# Patient Record
Sex: FEMALE | Race: ASIAN | NOT HISPANIC OR LATINO | URBAN - METROPOLITAN AREA
[De-identification: names, ages, dates, MRNs, and addresses within clinical notes are randomized per-mention and may not be internally consistent; named-entity substitution may affect disease eponyms.]

---

## 2017-08-23 PROBLEM — 16331000 HEARTBURN: Status: ACTIVE | Noted: 2017-08-23

## 2017-08-23 PROBLEM — 428283002 HISTORY OF POLYP OF COLON (SITUATION): Status: ACTIVE | Noted: 2017-08-23

## 2021-04-12 ENCOUNTER — OFFICE VISIT (OUTPATIENT)
Dept: URBAN - METROPOLITAN AREA CLINIC 42 | Facility: CLINIC | Age: 61
End: 2021-04-12
Payer: COMMERCIAL

## 2021-04-12 ENCOUNTER — DASHBOARD ENCOUNTERS (OUTPATIENT)
Age: 61
End: 2021-04-12

## 2021-04-12 DIAGNOSIS — K27.9 PUD (PEPTIC ULCER DISEASE): ICD-10-CM

## 2021-04-12 PROBLEM — 13200003: Status: ACTIVE | Noted: 2021-04-12

## 2021-04-12 PROCEDURE — 99203 OFFICE O/P NEW LOW 30 MIN: CPT | Performed by: INTERNAL MEDICINE

## 2021-04-12 NOTE — HPI-TODAY'S VISIT:
The patient is here for a history of PUD. She has been a patient of Dr. Abe Toney and actually wanted to see him instead of me and she felt that she might have made this appointment by mistake.  She denies any active GI symptoms.  Her last EGD in 2015 did not show any signs of PUD.  Her colonoscopy was in 2015 as well and had a benign polyp removed.

## 2021-04-26 ENCOUNTER — OFFICE VISIT (OUTPATIENT)
Dept: URBAN - METROPOLITAN AREA CLINIC 37 | Facility: CLINIC | Age: 61
End: 2021-04-26

## 2021-04-26 ENCOUNTER — LAB OUTSIDE AN ENCOUNTER (OUTPATIENT)
Dept: URBAN - METROPOLITAN AREA CLINIC 37 | Facility: CLINIC | Age: 61
End: 2021-04-26

## 2021-04-26 VITALS — BODY MASS INDEX: 18.5 KG/M2 | WEIGHT: 98 LBS | HEIGHT: 61 IN

## 2021-04-26 RX ORDER — PANTOPRAZOLE SODIUM 40 MG/1
1 TABLET TABLET, DELAYED RELEASE ORAL ONCE A DAY
Status: ACTIVE | COMMUNITY

## 2021-04-26 RX ORDER — SIMVASTATIN 20 MG/1
1 TABLET IN THE EVENING TABLET, FILM COATED ORAL
Status: ACTIVE | COMMUNITY

## 2021-04-26 RX ORDER — TURMERIC/TURMERIC ROOT EXTRACT 450MG-50MG
AS DIRECTED CAPSULE ORAL
Status: ACTIVE | COMMUNITY

## 2021-04-26 RX ORDER — AMLODIPINE BESYLATE 2.5 MG/1
1 TABLET TABLET ORAL ONCE A DAY
Status: ACTIVE | COMMUNITY

## 2021-04-26 RX ORDER — PANTOPRAZOLE SODIUM 40 MG/1
1 TABLET 30 MINUTES BEFORE BREAKFAST TABLET, DELAYED RELEASE ORAL ONCE A DAY
Qty: 90 | Refills: 1 | Status: ON HOLD | COMMUNITY
Start: 2017-10-02

## 2021-04-26 NOTE — HPI-MIGRATED HPI
Acute visit : Patient is here for an acute visit -> re- establish GI care after changing back to [employer] She had history of gastritis with intestinal metaplasia notd on egd in -9/2017. She was placed on PPI and advised to repeat EGD in 2 years. She changed her insurance to [employer]. Therefore, her surveillance EGD was done in 2019 at [facility] , could not recall the result, but was told normal  She had h/o gastric ulcers and concerns of her ulcers progression She currently taking Pantoprazole from PCP Current symptoms: feeling normal , sometimes with mild burning  Current BM patterns: normal BM daily ;

## 2021-06-21 ENCOUNTER — OFFICE VISIT (OUTPATIENT)
Dept: URBAN - METROPOLITAN AREA MEDICAL CENTER 10 | Facility: MEDICAL CENTER | Age: 61
End: 2021-06-21

## 2021-06-21 ENCOUNTER — TELEPHONE ENCOUNTER (OUTPATIENT)
Dept: URBAN - METROPOLITAN AREA CLINIC 35 | Facility: CLINIC | Age: 61
End: 2021-06-21

## 2021-07-12 ENCOUNTER — OFFICE VISIT (OUTPATIENT)
Dept: URBAN - METROPOLITAN AREA CLINIC 37 | Facility: CLINIC | Age: 61
End: 2021-07-12

## 2021-07-12 ENCOUNTER — OFFICE VISIT (OUTPATIENT)
Dept: URBAN - METROPOLITAN AREA MEDICAL CENTER 10 | Facility: MEDICAL CENTER | Age: 61
End: 2021-07-12

## 2021-08-02 ENCOUNTER — LAB OUTSIDE AN ENCOUNTER (OUTPATIENT)
Dept: URBAN - METROPOLITAN AREA CLINIC 37 | Facility: CLINIC | Age: 61
End: 2021-08-02

## 2021-08-02 ENCOUNTER — OFFICE VISIT (OUTPATIENT)
Dept: URBAN - METROPOLITAN AREA CLINIC 37 | Facility: CLINIC | Age: 61
End: 2021-08-02

## 2021-08-02 ENCOUNTER — LAB OUTSIDE AN ENCOUNTER (OUTPATIENT)
Dept: URBAN - METROPOLITAN AREA CLINIC 35 | Facility: CLINIC | Age: 61
End: 2021-08-02

## 2021-08-02 ENCOUNTER — TELEPHONE ENCOUNTER (OUTPATIENT)
Dept: URBAN - METROPOLITAN AREA CLINIC 35 | Facility: CLINIC | Age: 61
End: 2021-08-02

## 2021-08-02 VITALS
HEIGHT: 61 IN | SYSTOLIC BLOOD PRESSURE: 124 MMHG | WEIGHT: 98 LBS | DIASTOLIC BLOOD PRESSURE: 82 MMHG | BODY MASS INDEX: 18.5 KG/M2

## 2021-08-02 RX ORDER — PANTOPRAZOLE SODIUM 40 MG/1
1 TABLET TABLET, DELAYED RELEASE ORAL ONCE A DAY
Status: ACTIVE | COMMUNITY

## 2021-08-02 RX ORDER — TURMERIC/TURMERIC ROOT EXTRACT 450MG-50MG
AS DIRECTED CAPSULE ORAL
Status: ACTIVE | COMMUNITY

## 2021-08-02 RX ORDER — SIMVASTATIN 20 MG/1
1 TABLET IN THE EVENING TABLET, FILM COATED ORAL
Status: ACTIVE | COMMUNITY

## 2021-08-02 RX ORDER — AMLODIPINE BESYLATE 2.5 MG/1
1 TABLET TABLET ORAL ONCE A DAY
Status: ACTIVE | COMMUNITY

## 2021-08-02 RX ORDER — PANTOPRAZOLE SODIUM 40 MG/1
1 TABLET TABLET, DELAYED RELEASE ORAL ONCE A DAY
Qty: 30 | Refills: 3 | OUTPATIENT
Start: 2021-08-02

## 2021-10-04 ENCOUNTER — OFFICE VISIT (OUTPATIENT)
Dept: URBAN - METROPOLITAN AREA CLINIC 37 | Facility: CLINIC | Age: 61
End: 2021-10-04

## 2021-10-04 VITALS
BODY MASS INDEX: 18.12 KG/M2 | SYSTOLIC BLOOD PRESSURE: 146 MMHG | WEIGHT: 96 LBS | DIASTOLIC BLOOD PRESSURE: 92 MMHG | HEIGHT: 61 IN

## 2021-10-04 PROBLEM — 72519002: Status: ACTIVE | Noted: 2021-08-02

## 2021-10-04 PROBLEM — 275978004 SCREENING FOR MALIGNANT NEOPLASM OF COLON: Status: ACTIVE | Noted: 2021-04-26

## 2021-10-04 PROBLEM — 196731005 GASTRODUODENITIS: Status: ACTIVE | Noted: 2017-08-23

## 2021-10-04 PROBLEM — 386211005: Status: ACTIVE | Noted: 2021-08-02

## 2021-10-04 PROBLEM — 4556007 GASTRITIS: Status: ACTIVE | Noted: 2017-10-02

## 2021-10-04 RX ORDER — AMLODIPINE BESYLATE 2.5 MG/1
1 TABLET TABLET ORAL ONCE A DAY
Status: ACTIVE | COMMUNITY

## 2021-10-04 RX ORDER — PANTOPRAZOLE SODIUM 20 MG/1
1 TABLET TABLET, DELAYED RELEASE ORAL ONCE A DAY
Qty: 90 TABLET | Refills: 3 | OUTPATIENT

## 2021-10-04 RX ORDER — TURMERIC/TURMERIC ROOT EXTRACT 450MG-50MG
AS DIRECTED CAPSULE ORAL
Status: ACTIVE | COMMUNITY

## 2021-10-04 RX ORDER — PANTOPRAZOLE SODIUM 40 MG/1
1 TABLET TABLET, DELAYED RELEASE ORAL ONCE A DAY
Qty: 30 | Refills: 3 | Status: ACTIVE | COMMUNITY
Start: 2021-08-02

## 2021-10-04 RX ORDER — SIMVASTATIN 20 MG/1
1 TABLET IN THE EVENING TABLET, FILM COATED ORAL
Status: ACTIVE | COMMUNITY

## 2021-10-04 NOTE — HPI-MIGRATED HPI
Follow up OV : Last OV was -> 2 months ago EGD on 07/12/2021 showed mild chronic gastritis with positive intestinal metaplasia;   Follow up OV : Patient is on -> Pantoprazole 40mg QD;   Follow up OV : Current symptoms are -> improved. Denies any abodminal pain, nausea/vomiting Current BM: normal She was referred for GES due to food residue found in the stomach during EGD. The result was normal . Today, she admits that she ate sticky rice in the morning of EGD ;   Follow up OV : Patient is here for a routine OV for -> gastritis, gastric intestinal metaplasia;   Interim investigations : Imaging studies: ->  * Gastric Emptying Study on 08/24/2021: The half time of gastric studying was 84 minutes. Normal gastric emptying  study ;

## 2022-10-03 ENCOUNTER — OFFICE VISIT (OUTPATIENT)
Dept: URBAN - METROPOLITAN AREA CLINIC 37 | Facility: CLINIC | Age: 62
End: 2022-10-03